# Patient Record
Sex: FEMALE | Race: WHITE
[De-identification: names, ages, dates, MRNs, and addresses within clinical notes are randomized per-mention and may not be internally consistent; named-entity substitution may affect disease eponyms.]

---

## 2019-12-12 ENCOUNTER — HOSPITAL ENCOUNTER (INPATIENT)
Dept: HOSPITAL 41 - JD.OB | Age: 29
LOS: 2 days | Discharge: HOME | DRG: 560 | End: 2019-12-14
Attending: OBSTETRICS & GYNECOLOGY | Admitting: OBSTETRICS & GYNECOLOGY
Payer: COMMERCIAL

## 2019-12-12 DIAGNOSIS — Z3A.40: ICD-10-CM

## 2019-12-12 DIAGNOSIS — O48.0: Primary | ICD-10-CM

## 2019-12-12 NOTE — PCM.LDHP
<Heaven Hdz - Last Filed: 19 21:44>





L&D History of Present Illness





- General


Date of Service: 19


Admit Problem/Dx: 


 Patient Status Order with Admit Dx/Problem





19 19:46


Patient Status [ADT] Routine 








 Admission Diagnosis/Problem











Admission Diagnosis/Problem    Pregnancy














Source of Information: Patient


History Limitations: Reports: No Limitations





- History of Present Illness


Introduction:: 





Elise is a 29yoF , GALILEO 2019, EGA 40 3/7, who presents to labor and 

delivery for induction of labor. Her previous delivery was 6/15/2014 and was a 

vacuum assisted vaginal delivery at 41 weeks to a 7lb 10oz infant. Her blood 

type is B+. 








2019: GBS negative 


10/15/2019: influenza and TDaP immunizations given 


9/10/2019: 1 hour glucose screen was 99


2019: US showed normal growth and anatomy, placenta posterior


2019: HbsAg nonreactive, gonorrhoea and chlamydia negative, RPR nonreactive

, HIV negative, rubella immune 





- Related Data


Home Medications: 


 Home Meds





Mv-Mn/Iron/FA/Herbal/Digestive [Prenatal One Tablet] 1 each PO DAILY 19 [

History]











Past Medical History





- Past Health History


Medical/Surgical History: Denies Medical/Surgical History


OB/GYN History: Reports: Pregnancy





Social & Family History





- Family History


Family Medical History: Noncontributory





- Tobacco Use


Smoking Status *Q: Never Smoker


Second Hand Smoke Exposure: No





- Caffeine Use


Caffeine Use: Reports: None





- Recreational Drug Use


Recreational Drug Use: No





H&P Review of Systems





- Review of Systems:


Review Of Systems: See Below


General: Reports: No Symptoms


HEENT: Reports: No Symptoms


Pulmonary: Reports: No Symptoms


Cardiovascular: Reports: No Symptoms


Gastrointestinal: Reports: No Symptoms


Genitourinary: Reports: No Symptoms


Musculoskeletal: Reports: No Symptoms


Skin: Reports: No Symptoms


Psychiatric: Reports: No Symptoms


Neurological: Reports: No Symptoms





L&D Exam





- Exam


Exam: See Below





- Vital Signs


Vital Signs: 


 Last Vital Signs











Temp  98.1 F   19 19:46


 


Pulse  74   19 19:46


 


Resp  16   19 19:46


 


BP  146/88 H  19 19:46


 


Pulse Ox  100   12/12/19 19:46











Weight: 103.419 kg





- Exam


General: Alert, Oriented


HEENT: Conjunctiva Clear, Mucosa Moist & Pink


Neck: Supple, Trachea Midline


Lungs: Clear to Auscultation, Normal Respiratory Effort


Cardiovascular: Regular Rate, Regular Rhythm


GI/Abdominal Exam: Normal Bowel Sounds, Soft, Non-Tender


Extremities: Normal Inspection, Normal Capillary Refill


Skin: Warm, Dry, Intact


Psychiatric: Alert, Normal Affect, Normal Mood





- Patient Data


Lab Results Last 24 hrs: 


 Laboratory Results - last 24 hr











  19 Range/Units





  19:59 19:59 


 


WBC   8.78  (3.98-10.04)  K/mm3


 


RBC   4.34  (3.98-5.22)  M/mm3


 


Hgb   13.5  (11.2-15.7)  gm/dl


 


Hct   38.3  (34.1-44.9)  %


 


MCV   88.2  (79.4-94.8)  fl


 


MCH   31.1  (25.6-32.2)  pg


 


MCHC   35.2  (32.2-35.5)  g/dl


 


RDW Std Deviation   39.7  (36.4-46.3)  fL


 


Plt Count   149 L  (182-369)  K/mm3


 


MPV   11.0  (9.4-12.3)  fl


 


Neut % (Auto)   72.3 H  (34.0-71.1)  %


 


Lymph % (Auto)   19.9  (19.3-51.7)  %


 


Mono % (Auto)   5.7  (4.7-12.5)  %


 


Eos % (Auto)   1.8  (0.7-5.8)  


 


Baso % (Auto)   0.1  (0.1-1.2)  %


 


Neut # (Auto)   6.34 H  (1.56-6.13)  K/mm3


 


Lymph # (Auto)   1.75  (1.18-3.74)  K/mm3


 


Mono # (Auto)   0.50 H  (0.24-0.36)  K/mm3


 


Eos # (Auto)   0.16  (0.04-0.36)  K/mm3


 


Baso # (Auto)   0.01  (0.01-0.08)  K/mm3


 


RPR  Non-reactive   (NONREACTIVE)  











Result Diagrams: 


 19 19:59





Orders Last 24hrs: 


 Active Orders 24 hr











 Category Date Time Status


 


 Patient Status [ADT] Routine ADT  19 19:46 Active


 


 Activity as Tolerated [RC] PFP Care  19 19:46 Active


 


 Communication Order [RC] ASDIRECTED Care  19 19:46 Active


 


 Fetal Heart Tones [RC] ASDIRECTED Care  19 19:46 Active


 


 Notify Provider [RC] PFP Care  19 19:46 Active


 


 Notify Provider [RC] PRN Care  19 19:46 Active


 


 Peripheral IV Care [RC] .AS DIRECTED Care  19 19:46 Active


 


 Pump Management, Intrathecal [RC] ASDIRECTED Care  19 19:47 Active


 


 Vital Signs [RC] PER UNIT ROUTINE Care  19 19:46 Active


 


 Regular Diet [DIET] Diet  19 Breakfast Active


 


 BLOOD BANK HOLD SPECIMEN [BBK] Stat Lab  19 19:46 Ordered


 


 Lactated Ringers [Ringers, Lactated] 1,000 ml Med  19 20:00 Active





 IV ASDIRECTED   


 


 Nalbuphine [Nubain] Med  19 19:46 Active





 10 mg IVPUSH Q2H PRN   


 


 Ondansetron [Zofran] Med  19 19:46 Active





 4 mg IVPUSH Q4H PRN   


 


 Oxytocin/Lactated Ringers [Pitocin in LR 10 Units/1,000 Med  19 20:00 

Active





 ML]   





 10 unit in 1,000 ml IV .CONTINUOUS   


 


 Oxytocin/Lactated Ringers [Pitocin in LR 10 Units/1,000 Med  19 20:00 

Active





 ML]   





 10 unit in 1,000 ml IV TITRATE   


 


 Sodium Chloride 0.9% [Saline Flush] Med  19 19:46 Active





 10 ml FLUSH ASDIRECTED PRN   


 


 Electronic Fetal Heart Tones Ext w TOCO [WOMSER] Oth  19 19:46 Ordered





 Routine   


 


 Electronic Fetal Heart Tones Internal [WOMSER] Per Unit Oth  19 19:46 

Ordered





 Routine   


 


 Peripheral IV Insertion Adult [OM.PC] Routine Oth  19 19:46 Ordered


 


 Resuscitation Status Routine Resus Stat  19 19:46 Ordered








 Medication Orders





Lactated Ringer's (Ringers, Lactated)  1,000 mls @ 100 mls/hr IV ASDIRECTED CHARLES


   Last Admin: 19 20:11  Dose: 100 mls/hr


Oxytocin/Lactated Ringer's (Pitocin In Lr 10 Units/1,000 Ml)  10 unit in 1,000 

mls @ 12 mls/hr IV TITRATE CHARLES; Protocol


   Last Titration: 19 20:50  Dose: 6 munits/min, 36 mls/hr


   Titration: 19 20:30  Dose: 4 munits/min, 24 mls/hr


   Admin: 19 20:11  Dose: 2 munits/min, 12 mls/hr


Oxytocin/Lactated Ringer's (Pitocin In Lr 10 Units/1,000 Ml)  10 unit in 1,000 

mls @ 500 mls/hr IV .CONTINUOUS CHARLES


Nalbuphine HCl (Nubain)  10 mg IVPUSH Q2H PRN


   PRN Reason: Pain


Ondansetron HCl (Zofran)  4 mg IVPUSH Q4H PRN


   PRN Reason: Nausea/Vomiting


Sodium Chloride (Saline Flush)  10 ml FLUSH ASDIRECTED PRN


   PRN Reason: Keep Vein Open








Assessment/Plan Comment:: 


Assessment: 


Elise is a 29yoF, , AGLILEO 2019, EGA 40 3/7, who presents to labor 

and delivery for induction of labor. Amniotomy preformed. Elise would like to 

try and avoid an epidural during labor but will let nursing staff know if she 

requires something for pain control. She plans to breastfeed. 





Plan: 


1. augmentation of labor with pitocin 





2. Assess pain throughout labor and epidural if she'd like for pain control





3. RPR and CBC per protocol 





4. Lactation support 





<Crispin Ren F - Last Filed: 19 00:56>





L&D History of Present Illness





- General


Admit Problem/Dx: 


 Patient Status Order with Admit Dx/Problem





19 19:46


Patient Status [ADT] Routine 








 Admission Diagnosis/Problem











Admission Diagnosis/Problem    Pregnancy

















H&P Review of Systems





- Review of Systems:


Review Of Systems: See Below





L&D Exam





- Exam


Exam: See Below





- Vital Signs


Vital Signs: 


 Last Vital Signs











Temp  36.7 C   19 19:46


 


Pulse  74   19 19:46


 


Resp  16   19 19:46


 


BP  146/88 H  19 19:46


 


Pulse Ox  100   19 19:46














- Patient Data


Lab Results Last 24 hrs: 


 Laboratory Results - last 24 hr











  19 Range/Units





  19:59 19:59 


 


WBC   8.78  (3.98-10.04)  K/mm3


 


RBC   4.34  (3.98-5.22)  M/mm3


 


Hgb   13.5  (11.2-15.7)  gm/dl


 


Hct   38.3  (34.1-44.9)  %


 


MCV   88.2  (79.4-94.8)  fl


 


MCH   31.1  (25.6-32.2)  pg


 


MCHC   35.2  (32.2-35.5)  g/dl


 


RDW Std Deviation   39.7  (36.4-46.3)  fL


 


Plt Count   149 L  (182-369)  K/mm3


 


MPV   11.0  (9.4-12.3)  fl


 


Neut % (Auto)   72.3 H  (34.0-71.1)  %


 


Lymph % (Auto)   19.9  (19.3-51.7)  %


 


Mono % (Auto)   5.7  (4.7-12.5)  %


 


Eos % (Auto)   1.8  (0.7-5.8)  


 


Baso % (Auto)   0.1  (0.1-1.2)  %


 


Neut # (Auto)   6.34 H  (1.56-6.13)  K/mm3


 


Lymph # (Auto)   1.75  (1.18-3.74)  K/mm3


 


Mono # (Auto)   0.50 H  (0.24-0.36)  K/mm3


 


Eos # (Auto)   0.16  (0.04-0.36)  K/mm3


 


Baso # (Auto)   0.01  (0.01-0.08)  K/mm3


 


RPR  Non-reactive   (NONREACTIVE)  











Result Diagrams: 


 19 19:59





Problem List Initiated/Reviewed/Updated: Yes


Orders Last 24hrs: 


 Active Orders 24 hr











 Category Date Time Status


 


 Patient Status [ADT] Routine ADT  19 19:46 Active


 


 Activity as Tolerated [RC] PFP Care  19 19:46 Active


 


 Communication Order [RC] ASDIRECTED Care  19 19:46 Active


 


 Fetal Heart Tones [RC] ASDIRECTED Care  19 19:46 Active


 


 Notify Provider [RC] PFP Care  19 19:46 Active


 


 Notify Provider [RC] PRN Care  19 19:46 Active


 


 Peripheral IV Care [RC] .AS DIRECTED Care  19 19:46 Active


 


 Pump Management, Intrathecal [RC] ASDIRECTED Care  19 19:47 Active


 


 Vital Signs [RC] PER UNIT ROUTINE Care  19 19:46 Active


 


 Regular Diet [DIET] Diet  19 Breakfast Active


 


 BLOOD BANK HOLD SPECIMEN [BBK] Stat Lab  19 19:46 Ordered


 


 Lactated Ringers [Ringers, Lactated] 1,000 ml Med  19 20:00 Active





 IV ASDIRECTED   


 


 Nalbuphine [Nubain] Med  19 19:46 Active





 10 mg IVPUSH Q2H PRN   


 


 Ondansetron [Zofran] Med  19 19:46 Active





 4 mg IVPUSH Q4H PRN   


 


 Oxytocin/Lactated Ringers [Pitocin in LR 10 Units/1,000 Med  19 20:00 

Active





 ML]   





 10 unit in 1,000 ml IV .CONTINUOUS   


 


 Oxytocin/Lactated Ringers [Pitocin in LR 10 Units/1,000 Med  19 20:00 

Active





 ML]   





 10 unit in 1,000 ml IV TITRATE   


 


 Sodium Chloride 0.9% [Saline Flush] Med  19 19:46 Active





 10 ml FLUSH ASDIRECTED PRN   


 


 Electronic Fetal Heart Tones Ext w TOCO [WOMSER] Oth  19 19:46 Ordered





 Routine   


 


 Electronic Fetal Heart Tones Internal [WOMSER] Per Unit Oth  19 19:46 

Ordered





 Routine   


 


 Peripheral IV Insertion Adult [OM.PC] Routine Oth  19 19:46 Ordered


 


 Resuscitation Status Routine Resus Stat  19 19:46 Ordered








 Medication Orders





Lactated Ringer's (Ringers, Lactated)  1,000 mls @ 100 mls/hr IV ASDIRECTED CHARLES


   Last Admin: 19 20:11  Dose: 100 mls/hr


Oxytocin/Lactated Ringer's (Pitocin In Lr 10 Units/1,000 Ml)  10 unit in 1,000 

mls @ 12 mls/hr IV TITRATE CHARLES; Protocol


   Last Titration: 19 23:00  Dose: 8 munits/min, 48 mls/hr


   Titration: 19 20:50  Dose: 6 munits/min, 36 mls/hr


   Titration: 19 20:30  Dose: 4 munits/min, 24 mls/hr


   Admin: 19 20:11  Dose: 2 munits/min, 12 mls/hr


Oxytocin/Lactated Ringer's (Pitocin In Lr 10 Units/1,000 Ml)  10 unit in 1,000 

mls @ 500 mls/hr IV .CONTINUOUS CHARLES


Nalbuphine HCl (Nubain)  10 mg IVPUSH Q2H PRN


   PRN Reason: Pain


Ondansetron HCl (Zofran)  4 mg IVPUSH Q4H PRN


   PRN Reason: Nausea/Vomiting


Sodium Chloride (Saline Flush)  10 ml FLUSH ASDIRECTED PRN


   PRN Reason: Keep Vein Open








Assessment/Plan Comment:: 





I reviewed the history and physical and agree with its content.

## 2019-12-13 PROCEDURE — 3E033VJ INTRODUCTION OF OTHER HORMONE INTO PERIPHERAL VEIN, PERCUTANEOUS APPROACH: ICD-10-PCS | Performed by: OBSTETRICS & GYNECOLOGY

## 2019-12-13 PROCEDURE — 10907ZC DRAINAGE OF AMNIOTIC FLUID, THERAPEUTIC FROM PRODUCTS OF CONCEPTION, VIA NATURAL OR ARTIFICIAL OPENING: ICD-10-PCS | Performed by: OBSTETRICS & GYNECOLOGY

## 2019-12-13 NOTE — PCM.SN
- Free Text/Narrative


Note: 





Delivery note:





Elise is a 29-year-old  2 now para 2002 female who is admitted at 40-3/

7 weeks gestational age on 2019 for elective induction of labor. She was 

started on Pitocin and then underwent artificial rupture membranes with 

resultant clear amniotic fluid. She progressed slowly in labor until the a.m. 

of 2019 she then began pushing. She had a deceleration in fetal heart 

rate to the 80s and 90s and after several spontaneous pushing contractions 

decision was made to proceed with vacuum extraction delivery. During the course 

of her labor she had 1 dose of Nubain but had no other analgesia in place.





At 0554 hrs. on 2019 that extraction was performed. In 2 contractions the 

baby's head delivered. There were no pop offs. A positive turtle sign occurred 

indicating probable possible shoulder dystocia. Shoulder dystocia was diagnosed 

and with rotational maneuvers and suprapubic pressure the shoulder was 

dislodged and the baby was delivered. Baby was delivered in a right occiput 

anterior position having rotated around from right occiput posterior. The 

perineum remained intact and patient required no suturing. The baby was 

completely delivered and was placed initially on mom's abdomen. Decision was 

made to clamp the cord, cut it and to take the baby to the warmer for 

evaluation. Baby did well however had Apgars of 8 and 9, a weight of 3980 g (8 

pounds 12.4 ounces) and a length of 22 inches.





Upon delivery the Pitocin was increased to 500 mL per hour per protocol 2 

increase tone and uterus decrease likelihood of bleeding. Estimated blood loss 

was about 300 mL. The vagina and vulva were visualized and no stitches were 

required. The delivered in a French presentation, appeared intact and complete 

and was discarded per patient desire. Patient plans to breast-feed. Condition 

good.

## 2019-12-14 VITALS — DIASTOLIC BLOOD PRESSURE: 68 MMHG | SYSTOLIC BLOOD PRESSURE: 119 MMHG | HEART RATE: 56 BPM

## 2019-12-14 NOTE — PCM.DCSUM1
**Discharge Summary





- Hospital Course


Diagnosis: Stroke: No





- Discharge Data


Discharge Date: 12/14/19


Discharge Disposition: Home, Self-Care 01


Condition: Good





- Referral to Home Health


Primary Care Physician: 


Genesis Kerns PA-C








- Patient Instructions


Diet: Usual Diet as Tolerated


Activity: No Strenuous Activities


Driving: May Drive Today


Showering/Bathing: May Shower


Notify Provider of: Fever, Increased Pain, Swelling and Redness, Drainage, 

Nausea and/or Vomiting





- Discharge Plan


*PRESCRIPTION DRUG MONITORING PROGRAM REVIEWED*: No


*COPY OF PRESCRIPTION DRUG MONITORING REPORT IN PATIENT YOUSUF: No


Home Medications: 


 Home Meds





Mv-Mn/Iron/FA/Herbal/Digestive [Prenatal One Tablet] 1 each PO DAILY 12/12/19 [

History]








Referrals: 


Crispin Ren MD [Physician] -  (2 weeks)





- Discharge Summary/Plan Comment


DC Time >30 min.: No





- General Info


Functional Status: Reports: Pain Controlled





- Review of Systems


General: Reports: No Symptoms


HEENT: Reports: No Symptoms


Pulmonary: Reports: No Symptoms


Cardiovascular: Reports: No Symptoms


Gastrointestinal: Reports: No Symptoms


Genitourinary: Reports: No Symptoms


Musculoskeletal: Reports: No Symptoms


Skin: Reports: No Symptoms


Neurological: Reports: No Symptoms


Psychiatric: Reports: No Symptoms





- Patient Data


Vitals - Most Recent: 


 Last Vital Signs











Temp  36.4 C   12/14/19 02:49


 


Pulse  53 L  12/14/19 02:49


 


Resp  16   12/14/19 02:49


 


BP  126/80   12/14/19 02:49


 


Pulse Ox  99   12/14/19 02:49











Weight - Most Recent: 103.419 kg


I&O - Last 24 hours: 


 Intake & Output











 12/13/19 12/14/19 12/14/19





 22:59 06:59 14:59


 


Intake Total 180  


 


Balance 180  











Med Orders - Current: 


 Current Medications





Acetaminophen (Tylenol)  650 mg PO Q4H PRN


   PRN Reason: mild pain or fever


Benzocaine/Menthol (Dermoplast Pain Relief Spray)  0 gm TOP ASDIRECTED PRN


   PRN Reason: Perineal Comfort Measure


   Last Admin: 12/13/19 08:20 Dose:  1 canister


Docusate Sodium (Colace)  100 mg PO BID PRN


   PRN Reason: Constipation


Ibuprofen (Motrin)  600 mg PO Q4H PRN


   PRN Reason: Mild pain or fever


Witch Hazel (Tucks)  1 pad TOP ASDIRECTED PRN


   PRN Reason: Pain


   Last Admin: 12/13/19 08:20 Dose:  1 tub





Discontinued Medications





Ephedrine Sulfate (Ephedrine Sulfate)  5 mg IVPUSH ASDIRECTED PRN


   PRN Reason: Hypotension


Fentanyl (Sublimaze)  100 mcg EPIDUR Q3H PRN


   PRN Reason: Pain


Fentanyl/Bupivacaine HCl (Fentanyl/Bupivacaine/Ns 2 Mcg-0.125% 250 Ml)  0 ml 

EPIDUR CONTINUOUS PRN


   PRN Reason: Pain


Lactated Ringer's (Ringers, Lactated)  1,000 mls @ 100 mls/hr IV ASDIRECTED CHARLES


   Last Admin: 12/12/19 20:11 Dose:  100 mls/hr


Oxytocin/Lactated Ringer's (Pitocin In Lr 10 Units/1,000 Ml)  10 unit in 1,000 

mls @ 12 mls/hr IV TITRATE CHARLES; Protocol


   Last Titration: 12/13/19 05:56 Dose:  500 mls/hr


Oxytocin/Lactated Ringer's (Pitocin In Lr 10 Units/1,000 Ml)  10 unit in 1,000 

mls @ 500 mls/hr IV .CONTINUOUS CHARLES


Nalbuphine HCl (Nubain)  10 mg IVPUSH Q2H PRN


   PRN Reason: Pain


   Last Admin: 12/13/19 04:03 Dose:  10 mg


Ondansetron HCl (Zofran)  4 mg IVPUSH Q4H PRN


   PRN Reason: Nausea/Vomiting


Ondansetron HCl (Zofran)  4 mg IVPUSH ONETIME PRN


   PRN Reason: Nausea/Vomiting


Sodium Chloride (Saline Flush)  10 ml FLUSH ASDIRECTED PRN


   PRN Reason: Keep Vein Open











- Exam


General: Reports: Alert, Oriented


HEENT: Reports: Pupils Equal, Pupils Reactive, EOMI, Mucous Membr. Moist/Pink


Neck: Reports: Supple


Lungs: Reports: Clear to Auscultation, Normal Respiratory Effort


Cardiovascular: Reports: Regular Rate, Regular Rhythm


GI/Abdominal Exam: Normal Bowel Sounds, Soft, Non-Tender, No Organomegaly, No 

Distention, No Abnormal Bruit, No Mass, Pelvis Stable


Rectal (Female) Exam: Normal Exam, Normal Rectal Tone


Back Exam: Reports: Normal Inspection, Full Range of Motion


Extremities: Normal Inspection, Normal Range of Motion, Non-Tender, No Pedal 

Edema, Normal Capillary Refill


Skin: Reports: Warm, Dry, Intact


Wound/Incisions: Reports: Healing Well


Neurological: Reports: No New Focal Deficit


Psy/Mental Status: Reports: Alert, Normal Affect, Normal Mood

## 2023-01-13 ENCOUNTER — HOSPITAL ENCOUNTER (INPATIENT)
Dept: HOSPITAL 41 - JD.OB | Age: 33
LOS: 1 days | Discharge: HOME | DRG: 560 | End: 2023-01-14
Attending: OBSTETRICS & GYNECOLOGY | Admitting: OBSTETRICS & GYNECOLOGY
Payer: COMMERCIAL

## 2023-01-13 DIAGNOSIS — Z3A.39: ICD-10-CM

## 2023-01-13 DIAGNOSIS — Z15.89: ICD-10-CM

## 2023-01-13 PROCEDURE — 10907ZC DRAINAGE OF AMNIOTIC FLUID, THERAPEUTIC FROM PRODUCTS OF CONCEPTION, VIA NATURAL OR ARTIFICIAL OPENING: ICD-10-PCS | Performed by: OBSTETRICS & GYNECOLOGY

## 2023-01-14 VITALS — HEART RATE: 57 BPM | SYSTOLIC BLOOD PRESSURE: 108 MMHG | DIASTOLIC BLOOD PRESSURE: 59 MMHG
